# Patient Record
Sex: MALE | Race: WHITE
[De-identification: names, ages, dates, MRNs, and addresses within clinical notes are randomized per-mention and may not be internally consistent; named-entity substitution may affect disease eponyms.]

---

## 2018-09-21 ENCOUNTER — HOSPITAL ENCOUNTER (EMERGENCY)
Dept: HOSPITAL 41 - JD.ED | Age: 73
Discharge: HOME | End: 2018-09-21
Payer: COMMERCIAL

## 2018-09-21 DIAGNOSIS — I48.0: Primary | ICD-10-CM

## 2018-09-21 DIAGNOSIS — Z87.891: ICD-10-CM

## 2018-09-21 DIAGNOSIS — Z79.899: ICD-10-CM

## 2018-09-21 DIAGNOSIS — Z88.1: ICD-10-CM

## 2018-09-21 DIAGNOSIS — Z79.82: ICD-10-CM

## 2018-09-21 DIAGNOSIS — I50.43: ICD-10-CM

## 2018-09-21 PROCEDURE — 99284 EMERGENCY DEPT VISIT MOD MDM: CPT

## 2018-09-21 PROCEDURE — 93005 ELECTROCARDIOGRAM TRACING: CPT

## 2018-09-21 PROCEDURE — 85025 COMPLETE CBC W/AUTO DIFF WBC: CPT

## 2018-09-21 PROCEDURE — 80053 COMPREHEN METABOLIC PANEL: CPT

## 2018-09-21 PROCEDURE — 96374 THER/PROPH/DIAG INJ IV PUSH: CPT

## 2018-09-21 PROCEDURE — 83880 ASSAY OF NATRIURETIC PEPTIDE: CPT

## 2018-09-21 PROCEDURE — 71045 X-RAY EXAM CHEST 1 VIEW: CPT

## 2018-09-21 PROCEDURE — 85610 PROTHROMBIN TIME: CPT

## 2018-09-21 PROCEDURE — 36415 COLL VENOUS BLD VENIPUNCTURE: CPT

## 2018-09-21 NOTE — EDM.PDOC
ED HPI GENERAL MEDICAL PROBLEM





- General


Chief Complaint: Cardiovascular Problem


Stated Complaint: AFIB


Time Seen by Provider: 09/21/18 10:54


Source of Information: Reports: Patient, RN Notes Reviewed





- History of Present Illness


INITIAL COMMENTS - FREE TEXT/NARRATIVE: 





73-year-old male comes in with concern about continued atrial fibrillation. He 

does have history of occasional atrophia but has been in primarily sinus rhythm 

for several years. For this past week or so his heart rate readings have been 

running in the 100-120 range with one reading as high as 143. Therefore he has 

been suspicious of being in atrophia with his normal heart rate running in the 

60s. He does have history of a valve replacement about 3 years ago and also did 

have 1 vessel bypass at that time. Is on Coumadin. States he has been somewhat 

more short of breath, especially with exertion this past week or so. No chest 

pain. No abdominal pain nausea vomiting or diaphoresis.





- Related Data


 Allergies











Allergy/AdvReac Type Severity Reaction Status Date / Time


 


amoxicillin Allergy  Diarrhea Verified 09/21/18 10:37











Home Meds: 


 Home Meds





Allopurinol [Zyloprim] 300 mg PO DAILY 09/21/18 [History]


Ascorbic Acid/Ascorbate Sodium [Vit C-Bettie Hips 500 mg Chew Tb] 500 mg PO DAILY 

09/21/18 [History]


Aspirin 81 mg PO DAILY 09/21/18 [History]


Celecoxib 200 mg PO DAILY 09/21/18 [History]


Cholecalciferol (Vitamin D3) [Vitamin D] 2,000 unit PO DAILY 09/21/18 [History]


Flecainide [Tambocor] 50 mg PO BID 09/21/18 [History]


Flecainide [Tambocor] 100 mg PO Q12H PRN 09/21/18 [History]


Furosemide [Lasix] 20 mg PO DAILY 09/21/18 [History]


Lisinopril 40 mg PO DAILY 09/21/18 [History]


Metoprolol Tartrate 25 mg PO BID 09/21/18 [History]


Metoprolol Tartrate 25 mg PO Q12HR #60 tablet 09/21/18 [Rx]


Potassium Chloride [Klor-Con 10] 10 meq PO DAILY 09/21/18 [History]


Vitamin E 200 unit PO DAILY 09/21/18 [History]


Warfarin [Coumadin] 6 mg PO BEDTIME 09/21/18 [History]


amLODIPine Besylate [Amlodipine Besylate] 10 mg PO BEDTIME 09/21/18 [History]


atorvaSTATin [Lipitor] 40 mg PO BEDTIME 09/21/18 [History]











Past Medical History


HEENT History: Reports: Impaired Vision


Cardiovascular History: Reports: Afib





- Infectious Disease History


Infectious Disease History: Reports: Chicken Pox, Measles





- Past Surgical History


Cardiovascular Surgical History: Reports: Valve Replacement, Other (See Below)


Other Cardiovascular Surgeries/Procedures: bipassed vessel and replaced aortic 

valve





Social & Family History





- Family History


Family Medical History: Noncontributory





- Tobacco Use


Smoking Status *Q: Former Smoker


Years of Tobacco use: 30


Packs/Tins Daily: 0.5


Used Tobacco, but Quit: Yes


Month/Year Tobacco Last Used: 09/2003





- Caffeine Use


Caffeine Use: Reports: Coffee


Caffeine Use Comment: one cup of coffee every morning





- Recreational Drug Use


Recreational Drug Use: No





ED ROS GENERAL





- Review of Systems


Review Of Systems: See Below


Constitutional: Denies: Fever, Chills, Diaphoresis


HEENT: Reports: No Symptoms


Respiratory: Reports: Shortness of Breath (Exertional).  Denies: Wheezing, 

Pleuritic Chest Pain


Cardiovascular: Denies: Chest Pain


GI/Abdominal: Denies: Abdominal Pain, Nausea, Vomiting


Musculoskeletal: Denies: Shoulder Pain, Arm Pain, Back Pain


Skin: Reports: No Symptoms


Neurological: Reports: No Symptoms





ED EXAM, GENERAL





- Physical Exam


Exam: See Below


General Appearance: Alert, No Apparent Distress


Throat/Mouth: Normal Inspection, Normal Oropharynx


Head: No: Facial Swelling


Neck: Supple, Full Range of Motion


Respiratory/Chest: No Respiratory Distress, Lungs Clear, Normal Breath Sounds


Cardiovascular: Irregularly Irregular


GI/Abdominal: Soft, Non-Tender


Back Exam: No: CVA Tenderness (L), CVA Tenderness (R)


Extremities: Normal Inspection, Normal Range of Motion


Neurological: No Motor/Sensory Deficits


Skin Exam: Warm, Dry, Normal Color





EKG INTERPRETATION


EKG Date: 09/21/18


Rhythm: A-Fib


Rate (Beats/Min): 119


Axis: Normal


QRS: Normal


ST-T: Normal





Course





- Vital Signs


Last Recorded V/S: 


 Last Vital Signs











Temp  97.3 F   09/21/18 10:30


 


Pulse  113 H  09/21/18 12:14


 


Resp  18   09/21/18 10:30


 


BP  132/116 H  09/21/18 12:14


 


Pulse Ox  96   09/21/18 10:30














- Orders/Labs/Meds


Orders: 


 Active Orders 24 hr











 Category Date Time Status


 


 EKG 12 Lead [EKG Documentation Completion] [RC] STAT Care  09/21/18 11:06 

Active


 


 Peripheral IV Care [RC] .AS DIRECTED Care  09/21/18 11:07 Active


 


 Chest 1V Frontal [CR] Stat Exams  09/21/18 11:06 Taken


 


 Sodium Chloride 0.9% [Saline Flush] Med  09/21/18 11:05 Active





 10 ml FLUSH ASDIRECTED PRN   


 


 Peripheral IV Insertion Adult [OM.PC] Stat Oth  09/21/18 11:07 Ordered








 Medication Orders





Sodium Chloride (Saline Flush)  10 ml FLUSH ASDIRECTED PRN


   PRN Reason: Keep Vein Open


   Last Admin: 09/21/18 11:21  Dose: 10 ml








Labs: 


 Laboratory Tests











  09/21/18 09/21/18 09/21/18 Range/Units





  11:22 11:22 11:22 


 


WBC  7.42    (4.23-9.07)  K/mm3


 


RBC  4.51 L    (4.63-6.08)  M/mm3


 


Hgb  14.4    (13.7-17.5)  gm/L


 


Hct  43.4    (40.1-51.0)  %


 


MCV  96.2 H    (79.0-92.2)  fl


 


MCH  31.9    (25.7-32.2)  pg


 


MCHC  33.2    (32.2-35.5)  g/dl


 


RDW Std Deviation  47.1 H    (35.1-43.9)  fL


 


Plt Count  200    (163-337)  K/mm3


 


MPV  9.4    (9.4-12.3)  fl


 


Neut % (Auto)  79.4 H    (34.0-67.9)  %


 


Lymph % (Auto)  10.2 L    (21.8-53.1)  %


 


Mono % (Auto)  8.1    (5.3-12.2)  %


 


Eos % (Auto)  2.2    (0.8-7.0)  


 


Baso % (Auto)  0.1    (0.1-1.2)  %


 


Neut # (Auto)  5.89 H    (1.78-5.38)  K/mm3


 


Lymph # (Auto)  0.76 L    (1.32-3.57)  K/mm3


 


Mono # (Auto)  0.60    (0.30-0.82)  K/mm3


 


Eos # (Auto)  0.16    (0.04-0.54)  K/mm3


 


Baso # (Auto)  0.01    (0.01-0.08)  K/mm3


 


PT   28.3 H   (9.5-12.1)  SECONDS


 


INR   2.65   


 


Sodium    137  (136-145)  mEq/L


 


Potassium    4.1  (3.5-5.1)  mEq/L


 


Chloride    105  ()  mEq/L


 


Carbon Dioxide    27  (21-32)  mEq/L


 


Anion Gap    9.1  (5-15)  


 


BUN    21 H  (7-18)  mg/dL


 


Creatinine    1.0  (0.7-1.3)  mg/dL


 


Est Cr Clr Drug Dosing    69.96  mL/min


 


Estimated GFR (MDRD)    > 60  (>60)  mL/min


 


BUN/Creatinine Ratio    21.0 H  (14-18)  


 


Glucose    124 H  ()  mg/dL


 


Calcium    9.1  (8.5-10.1)  mg/dL


 


Total Bilirubin    0.9  (0.2-1.0)  mg/dL


 


AST    19  (15-37)  U/L


 


ALT    31  (16-63)  U/L


 


Alkaline Phosphatase    115  ()  U/L


 


NT-Pro-B Natriuret Pep     (0-125)  pg/mL


 


Total Protein    7.2  (6.4-8.2)  g/dl


 


Albumin    3.8  (3.4-5.0)  g/dl


 


Globulin    3.4  gm/dL


 


Albumin/Globulin Ratio    1.1  (1-2)  














  09/21/18 Range/Units





  11:22 


 


WBC   (4.23-9.07)  K/mm3


 


RBC   (4.63-6.08)  M/mm3


 


Hgb   (13.7-17.5)  gm/L


 


Hct   (40.1-51.0)  %


 


MCV   (79.0-92.2)  fl


 


MCH   (25.7-32.2)  pg


 


MCHC   (32.2-35.5)  g/dl


 


RDW Std Deviation   (35.1-43.9)  fL


 


Plt Count   (163-337)  K/mm3


 


MPV   (9.4-12.3)  fl


 


Neut % (Auto)   (34.0-67.9)  %


 


Lymph % (Auto)   (21.8-53.1)  %


 


Mono % (Auto)   (5.3-12.2)  %


 


Eos % (Auto)   (0.8-7.0)  


 


Baso % (Auto)   (0.1-1.2)  %


 


Neut # (Auto)   (1.78-5.38)  K/mm3


 


Lymph # (Auto)   (1.32-3.57)  K/mm3


 


Mono # (Auto)   (0.30-0.82)  K/mm3


 


Eos # (Auto)   (0.04-0.54)  K/mm3


 


Baso # (Auto)   (0.01-0.08)  K/mm3


 


PT   (9.5-12.1)  SECONDS


 


INR   


 


Sodium   (136-145)  mEq/L


 


Potassium   (3.5-5.1)  mEq/L


 


Chloride   ()  mEq/L


 


Carbon Dioxide   (21-32)  mEq/L


 


Anion Gap   (5-15)  


 


BUN   (7-18)  mg/dL


 


Creatinine   (0.7-1.3)  mg/dL


 


Est Cr Clr Drug Dosing   mL/min


 


Estimated GFR (MDRD)   (>60)  mL/min


 


BUN/Creatinine Ratio   (14-18)  


 


Glucose   ()  mg/dL


 


Calcium   (8.5-10.1)  mg/dL


 


Total Bilirubin   (0.2-1.0)  mg/dL


 


AST   (15-37)  U/L


 


ALT   (16-63)  U/L


 


Alkaline Phosphatase   ()  U/L


 


NT-Pro-B Natriuret Pep  606 H  (0-125)  pg/mL


 


Total Protein   (6.4-8.2)  g/dl


 


Albumin   (3.4-5.0)  g/dl


 


Globulin   gm/dL


 


Albumin/Globulin Ratio   (1-2)  











Meds: 


Medications











Generic Name Dose Route Start Last Admin





  Trade Name Freq  PRN Reason Stop Dose Admin


 


Sodium Chloride  10 ml  09/21/18 11:05  09/21/18 11:21





  Saline Flush  FLUSH   10 ml





  ASDIRECTED PRN   Administration





  Keep Vein Open   





     





     





     














Discontinued Medications














Generic Name Dose Route Start Last Admin





  Trade Name Freq  PRN Reason Stop Dose Admin


 


Metoprolol Tartrate  5 mg  09/21/18 11:46  09/21/18 12:14





  Lopressor  IVPUSH  09/21/18 11:47  5 mg





  ONETIME ONE   Administration





     





     





     





     


 


Metoprolol Tartrate  25 mg  09/21/18 11:47  09/21/18 12:10





  Lopressor  PO  09/21/18 11:48  25 mg





  ONETIME ONE   Administration





     





     





     





     














- Re-Assessments/Exams


Free Text/Narrative Re-Assessment/Exam: 





09/21/18 14:10


Chest x-ray did show cardiomegaly, mild pulmonary congestion. He continued in 

atrial fib with initial rate running in the 100-115 range primarily, 

occasionally a bit faster. We did give metoprolol 5 mg IV followed by 25 mg 

oral. That did bring his rate down into the 80s. He does have an appointment to 

see his cardiologist in about 10 days. Discharge instructions as documented.





Departure





- Departure


Time of Disposition: 13:23


Disposition: Home, Self-Care 01


Condition: Fair


Clinical Impression: 


Atrial fibrillation


Qualifiers:


 Atrial fibrillation type: paroxysmal Qualified Code(s): I48.0 - Paroxysmal 

atrial fibrillation





Congestive heart failure


Qualifiers:


 Heart failure type: combined systolic and diastolic Heart failure chronicity: 

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 

(congestive) and diastolic (congestive) heart failure





Prescriptions: 


Metoprolol Tartrate 25 mg PO Q12HR #60 tablet


Instructions:  Heart Failure, Easy-to-Read, Atrial Fibrillation, Easy-to-Read


Referrals: 


PCP,Not In Area [Primary Care Provider] - 


Forms:  ED Department Discharge


Additional Instructions: 


Increase your metoprolol from 25 mg twice daily to 50 mg twice daily. If your 

heart rate does drop down into the 50s and 60s as discussed then you should be 

able to  safely go back to the 25 mg twice a day dosage. You are showing signs 

of fluid retention both lung to chest x-ray and also clinical exam. Increase 

your  furosemide to 40 mg daily as discussed.   See your Cardiologist in about 7

-10 days as planned.  Return to ED as needed.





- My Orders


Last 24 Hours: 


My Active Orders





09/21/18 11:05


Sodium Chloride 0.9% [Saline Flush]   10 ml FLUSH ASDIRECTED PRN 





09/21/18 11:06


EKG 12 Lead [EKG Documentation Completion] [RC] STAT 


Chest 1V Frontal [CR] Stat 





09/21/18 11:07


Peripheral IV Care [RC] .AS DIRECTED 


Peripheral IV Insertion Adult [OM.PC] Stat 














- Assessment/Plan


Last 24 Hours: 


My Active Orders





09/21/18 11:05


Sodium Chloride 0.9% [Saline Flush]   10 ml FLUSH ASDIRECTED PRN 





09/21/18 11:06


EKG 12 Lead [EKG Documentation Completion] [RC] STAT 


Chest 1V Frontal [CR] Stat 





09/21/18 11:07


Peripheral IV Care [RC] .AS DIRECTED 


Peripheral IV Insertion Adult [OM.PC] Stat

## 2018-09-24 NOTE — CR
Chest: Portable view of the chest was obtained.

 

Comparison: No prior chest x-ray.

 

Elevated left hemidiaphragm is seen.  Difficult to exclude 

superimposed left basilar parenchymal consolidation.  Heart is 

enlarged.  Pulmonary vessels are congested.  Previous sternotomy is 

noted with prosthetic heart valve and prior CABG.

 

Impression:

1.  Elevated left hemidiaphragm, difficult to exclude superimposed 

consolidation within the left base.

2.  Cardiomegaly with mild pulmonary vascular congestion and previous 

sternotomy.

 

Diagnostic code #3